# Patient Record
(demographics unavailable — no encounter records)

---

## 2025-02-20 NOTE — REASON FOR VISIT
[Initial Evaluation] : an initial evaluation of [Proteinuria] : proteinuria [Hematuria] : hematuria [Parents] : parents [Medical Records] : medical records

## 2025-02-21 NOTE — CONSULT LETTER
[Consult Letter:] : I had the pleasure of evaluating your patient, [unfilled]. [Please see my note below.] : Please see my note below. [Consult Closing:] : Thank you very much for allowing me to participate in the care of this patient.  If you have any questions, please do not hesitate to contact me. [Sincerely,] : Sincerely, [FreeTextEntry3] : Kassidy Gonzalez MD Pediatric Nephrologist [facility] [phone]

## 2025-02-21 NOTE — CONSULT LETTER
[Consult Letter:] : I had the pleasure of evaluating your patient, [unfilled]. [Please see my note below.] : Please see my note below. [Consult Closing:] : Thank you very much for allowing me to participate in the care of this patient.  If you have any questions, please do not hesitate to contact me. [Sincerely,] : Sincerely, [FreeTextEntry3] : Kassidy Gonzalez MD Pediatric Nephrologist [facility] [phone]

## 2025-03-20 NOTE — REASON FOR VISIT
[Follow-Up] : a follow-up visit for [Proteinuria] : proteinuria [Hematuria] : hematuria [Patient] : patient [Parents] : parents [Mother] : mother [Medical Records] : medical records

## 2025-03-21 NOTE — CONSULT LETTER
[Consult Letter:] : I had the pleasure of evaluating your patient, [unfilled]. [Please see my note below.] : Please see my note below. [Consult Closing:] : Thank you very much for allowing me to participate in the care of this patient.  If you have any questions, please do not hesitate to contact me. [Sincerely,] : Sincerely, [FreeTextEntry3] : Kassidy Gonzalez MD Pediatric Nephrologist [facility] [phone]

## 2025-03-21 NOTE — CONSULT LETTER
[Consult Letter:] : I had the pleasure of evaluating your patient, [unfilled]. [Please see my note below.] : Please see my note below. [Consult Closing:] : Thank you very much for allowing me to participate in the care of this patient.  If you have any questions, please do not hesitate to contact me. [Sincerely,] : Sincerely, [FreeTextEntry3] : Kassidy Gonzalez MD Pediatric Nephrologist [facility] [phone]